# Patient Record
Sex: MALE | URBAN - METROPOLITAN AREA
[De-identification: names, ages, dates, MRNs, and addresses within clinical notes are randomized per-mention and may not be internally consistent; named-entity substitution may affect disease eponyms.]

---

## 2023-06-30 ENCOUNTER — WEB ENCOUNTER (OUTPATIENT)
Dept: URBAN - METROPOLITAN AREA CLINIC 35 | Facility: CLINIC | Age: 23
End: 2023-06-30

## 2023-06-30 ENCOUNTER — CLAIMS CREATED FROM THE CLAIM WINDOW (OUTPATIENT)
Dept: URBAN - METROPOLITAN AREA CLINIC 35 | Facility: CLINIC | Age: 23
End: 2023-06-30

## 2023-06-30 ENCOUNTER — DASHBOARD ENCOUNTERS (OUTPATIENT)
Age: 23
End: 2023-06-30

## 2023-06-30 ENCOUNTER — OFFICE VISIT (OUTPATIENT)
Dept: URBAN - METROPOLITAN AREA CLINIC 35 | Facility: CLINIC | Age: 23
End: 2023-06-30

## 2023-06-30 VITALS
WEIGHT: 191 LBS | HEART RATE: 56 BPM | SYSTOLIC BLOOD PRESSURE: 114 MMHG | BODY MASS INDEX: 25.87 KG/M2 | OXYGEN SATURATION: 97 % | DIASTOLIC BLOOD PRESSURE: 78 MMHG | HEIGHT: 72 IN

## 2023-06-30 DIAGNOSIS — K59.09 CHANGE IN BOWEL MOVEMENTS INTERMITTENT CONSTIPATION. URGENCY IN THE MORNING.: ICD-10-CM

## 2023-06-30 DIAGNOSIS — R53.83 OTHER FATIGUE: ICD-10-CM

## 2023-06-30 DIAGNOSIS — K62.5 RECTAL BLEEDING: ICD-10-CM

## 2023-06-30 DIAGNOSIS — K59.01 SLOW TRANSIT CONSTIPATION: ICD-10-CM

## 2023-06-30 PROBLEM — 35298007: Status: ACTIVE | Noted: 2023-06-30

## 2023-06-30 PROCEDURE — 99204 OFFICE O/P NEW MOD 45 MIN: CPT | Performed by: PHYSICIAN ASSISTANT

## 2023-06-30 PROCEDURE — 99204 OFFICE O/P NEW MOD 45 MIN: CPT | Performed by: INTERNAL MEDICINE

## 2023-06-30 RX ORDER — SODIUM, POTASSIUM,MAG SULFATES 17.5-3.13G
177ML SOLUTION, RECONSTITUTED, ORAL ORAL
Qty: 1 | Refills: 0 | OUTPATIENT
Start: 2023-06-30 | End: 2023-07-02

## 2023-06-30 NOTE — HPI-CONSTIPATION
23  Year old male patient presents today for change in bowel habits. Patient admits longstanding history (six years) of constipation/change in bowel habits/varying bowel habits. He states that he strains to have a bowel movement. Patient currently admits at least one bowel movement per day. Stools are formed and normal. Denies any blood, mucus, or melena in stools. He denies any pruritus ani or rectal pain. He has never had a colonoscopy or EGD. Patient states that he has been experiencing extreme fatigue and has not tried any medication for his symptoms.

## 2023-06-30 NOTE — HPI-RECTAL BLEEDING
Pt admts to chronic rectal bleeding, unsure if in the stool, but in the toilet and has been occurring for six years. The blood is bright red.  No NSAID use.

## 2023-08-04 ENCOUNTER — OFFICE VISIT (OUTPATIENT)
Dept: URBAN - METROPOLITAN AREA SURGERY CENTER 8 | Facility: SURGERY CENTER | Age: 23
End: 2023-08-04

## 2023-08-25 ENCOUNTER — OFFICE VISIT (OUTPATIENT)
Dept: URBAN - METROPOLITAN AREA CLINIC 35 | Facility: CLINIC | Age: 23
End: 2023-08-25

## 2023-09-28 ENCOUNTER — OFFICE VISIT (OUTPATIENT)
Dept: URBAN - METROPOLITAN AREA SURGERY CENTER 8 | Facility: SURGERY CENTER | Age: 23
End: 2023-09-28

## 2023-10-17 ENCOUNTER — TELEPHONE ENCOUNTER (OUTPATIENT)
Dept: URBAN - METROPOLITAN AREA CLINIC 36 | Facility: CLINIC | Age: 23
End: 2023-10-17

## 2023-10-17 ENCOUNTER — OFFICE VISIT (OUTPATIENT)
Dept: URBAN - METROPOLITAN AREA CLINIC 35 | Facility: CLINIC | Age: 23
End: 2023-10-17

## 2023-10-17 NOTE — HPI-RECTAL BLEEDING
Admits/Denies continued rectal bleeding since his last visit. Admits/Denies completing labs.  Last visit: Pt admts to chronic rectal bleeding, unsure if in the stool, but in the toilet and has been occurring for six years. The blood is bright red.  No NSAID use.

## 2023-10-17 NOTE — HPI-CONSTIPATION
Patient presents today for a follow up. Currently admits -- bowel movements per --, with/without strain. Stools are --. Admits/Denies continuing to strain. Admits/Denies taking Colace. Patient did not complete Colonoscopy procedure.   Last visit: 23  Year old male patient presents today for change in bowel habits. Patient admits longstanding history (six years) of constipation/change in bowel habits/varying bowel habits. He states that he strains to have a bowel movement. Patient currently admits at least one bowel movement per day. Stools are formed and normal. Denies any blood, mucus, or melena in stools. He denies any pruritus ani or rectal pain. He has never had a colonoscopy or EGD. Patient states that he has been experiencing extreme fatigue and has not tried any medication for his symptoms.
